# Patient Record
Sex: MALE | Race: WHITE | NOT HISPANIC OR LATINO | Employment: FULL TIME | URBAN - NONMETROPOLITAN AREA
[De-identification: names, ages, dates, MRNs, and addresses within clinical notes are randomized per-mention and may not be internally consistent; named-entity substitution may affect disease eponyms.]

---

## 2022-05-21 ENCOUNTER — OFFICE VISIT (OUTPATIENT)
Dept: FAMILY MEDICINE | Facility: OTHER | Age: 47
End: 2022-05-21
Attending: NURSE PRACTITIONER

## 2022-05-21 VITALS
OXYGEN SATURATION: 95 % | TEMPERATURE: 98.6 F | RESPIRATION RATE: 14 BRPM | DIASTOLIC BLOOD PRESSURE: 80 MMHG | SYSTOLIC BLOOD PRESSURE: 130 MMHG | HEART RATE: 80 BPM | WEIGHT: 178 LBS

## 2022-05-21 DIAGNOSIS — Z11.3 SCREEN FOR STD (SEXUALLY TRANSMITTED DISEASE): ICD-10-CM

## 2022-05-21 DIAGNOSIS — R21 RASH OF GENITALIA: Primary | ICD-10-CM

## 2022-05-21 DIAGNOSIS — A60.01 HERPES SIMPLEX INFECTION OF PENIS: ICD-10-CM

## 2022-05-21 LAB
C TRACH DNA SPEC QL PROBE+SIG AMP: NEGATIVE
N GONORRHOEA DNA SPEC QL NAA+PROBE: NEGATIVE

## 2022-05-21 PROCEDURE — 87529 HSV DNA AMP PROBE: CPT | Mod: ZL | Performed by: NURSE PRACTITIONER

## 2022-05-21 PROCEDURE — 86780 TREPONEMA PALLIDUM: CPT | Mod: ZL | Performed by: NURSE PRACTITIONER

## 2022-05-21 PROCEDURE — 87591 N.GONORRHOEAE DNA AMP PROB: CPT | Mod: ZL | Performed by: NURSE PRACTITIONER

## 2022-05-21 PROCEDURE — 99203 OFFICE O/P NEW LOW 30 MIN: CPT | Performed by: NURSE PRACTITIONER

## 2022-05-21 PROCEDURE — 87491 CHLMYD TRACH DNA AMP PROBE: CPT | Mod: ZL | Performed by: NURSE PRACTITIONER

## 2022-05-21 PROCEDURE — 36415 COLL VENOUS BLD VENIPUNCTURE: CPT | Mod: ZL | Performed by: NURSE PRACTITIONER

## 2022-05-21 ASSESSMENT — PAIN SCALES - GENERAL: PAINLEVEL: NO PAIN (0)

## 2022-05-21 NOTE — PATIENT INSTRUCTIONS
Syphilis, herpes tests pending. Gonorrhea and chlamydia tests will result today. We will be in touch with results and treatment plan.     If all comes back negative, and symptoms persist recommend seeing your PCP.

## 2022-05-21 NOTE — PROGRESS NOTES
ASSESSMENT/PLAN:    I have reviewed the nursing notes.  I have reviewed the findings, diagnosis, plan and need for follow up with the patient.    1. Screen for STD (sexually transmitted disease)  - GC/Chlamydia by PCR  - Treponema Ab w Reflex to RPR and Titer  - Herpes Simplex Virus 1&2 by PCR  Negative gonorrhea and chlamydia.     2. Rash of genitalia  - Treponema Ab w Reflex to RPR and Titer  - Herpes Simplex Virus 1&2 by PCR; Standing  Results pending; if positive will treat accordingly.     Follow up if symptoms persist or worsen or concerns    I explained my diagnostic considerations and recommendations to the patient, who voiced understanding and agreement with the treatment plan. All questions were answered. We discussed potential side effects of any prescribed or recommended therapies, as well as expectations for response to treatments.    Sylvia Eubanks NP  5/21/2022  11:49 AM    HPI:  Ham Simpson is a 47 year old male who presents to [facility] today for concerns of std concerns. Desires testing today. Possible exposure. A few red bumps/lesions appeared on his penis spontaneously on Wednesday that itch but are not painful. He has not experienced something like this before. He has only one female partner and no other partners. He is worried and wants to figure out what this is.   No burning with urination or any urinary symptoms. No penile discharge. No known hx of stds.     History reviewed. No pertinent past medical history.  History reviewed. No pertinent surgical history.  Social History     Tobacco Use     Smoking status: Never Smoker     Smokeless tobacco: Current User   Substance Use Topics     Alcohol use: Not on file     No current outpatient medications on file.     Allergies   Allergen Reactions     Anesthesia S-I-60 [Kdc:Egg Phospholipids+Sodium Metabisulfite+Soybean Oil+Propofol]      Malignant hyperthermia.     Benzocaine Other (See Comments)     General anesthesia     Past medical  history, past surgical history, current medications and allergies reviewed and accurate to the best of my knowledge.      ROS:  Refer to HPI    /80 (BP Location: Right arm, Patient Position: Sitting, Cuff Size: Adult Regular)   Pulse 80   Temp 98.6  F (37  C) (Tympanic)   Resp 14   Wt 80.7 kg (178 lb)   SpO2 95%     EXAM:  General Appearance: Well appearing 47 year old male, appropriate appearance for age. No acute distress   Respiratory:   No increased work of breathing.  No cough appreciated.  :  There are several erythematous bumps with center scabs present on shaft of penis.   Psychological: normal affect, alert, oriented, and pleasant.     Results for orders placed or performed in visit on 05/21/22   GC/Chlamydia by PCR     Status: Normal    Specimen: Urine, Voided   Result Value Ref Range    Chlamydia Trachomatis Negative Negative    Neisseria gonorrhoeae Negative Negative    Narrative    Assay performed using [employer] real-time, reverse-transcriptase PCR.

## 2022-05-21 NOTE — NURSING NOTE
Chief Complaint   Patient presents with     STD       Initial /80 (BP Location: Right arm, Patient Position: Sitting, Cuff Size: Adult Regular)   Pulse 80   Temp 98.6  F (37  C) (Tympanic)   Resp 14   Wt 80.7 kg (178 lb)   SpO2 95%  There is no height or weight on file to calculate BMI.  Medication Reconciliation: complete      FOOD SECURITY SCREENING QUESTIONS:    The next two questions are to help us understand your food security.  If you are feeling you need any assistance in this area, we have resources available to support you today.    Hunger Vital Signs:  Within the past 12 months we worried whether our food would run out before we got money to buy more. Never  Within the past 12 months the food we bought just didn't last and we didn't have money to get more. Never        Advance care plan reviewed      Gloria Gibbs LPN on 5/21/2022 at 11:33 AM

## 2022-05-22 LAB
HSV1 DNA SPEC QL NAA+PROBE: NOT DETECTED
HSV2 DNA SPEC QL NAA+PROBE: DETECTED
T PALLIDUM AB SER QL: NONREACTIVE

## 2022-05-22 RX ORDER — VALACYCLOVIR HYDROCHLORIDE 1 G/1
1000 TABLET, FILM COATED ORAL 2 TIMES DAILY
Qty: 20 TABLET | Refills: 0 | Status: SHIPPED | OUTPATIENT
Start: 2022-05-22 | End: 2022-06-01

## 2023-09-22 ENCOUNTER — HOSPITAL ENCOUNTER (EMERGENCY)
Facility: OTHER | Age: 48
Discharge: HOME | End: 2023-09-22
Attending: STUDENT IN AN ORGANIZED HEALTH CARE EDUCATION/TRAINING PROGRAM | Admitting: STUDENT IN AN ORGANIZED HEALTH CARE EDUCATION/TRAINING PROGRAM
Payer: COMMERCIAL

## 2023-09-22 VITALS
BODY MASS INDEX: 29.02 KG/M2 | WEIGHT: 170 LBS | TEMPERATURE: 98.2 F | RESPIRATION RATE: 7 BRPM | OXYGEN SATURATION: 100 % | HEIGHT: 64 IN | SYSTOLIC BLOOD PRESSURE: 123 MMHG | HEART RATE: 70 BPM | DIASTOLIC BLOOD PRESSURE: 93 MMHG

## 2023-09-22 DIAGNOSIS — R55 PRE-SYNCOPE: ICD-10-CM

## 2023-09-22 LAB
ANION GAP SERPL CALCULATED.3IONS-SCNC: 13 MMOL/L (ref 7–15)
BASOPHILS # BLD AUTO: 0.1 10E3/UL (ref 0–0.2)
BASOPHILS NFR BLD AUTO: 1 %
BUN SERPL-MCNC: 16.3 MG/DL (ref 6–20)
CALCIUM SERPL-MCNC: 9.8 MG/DL (ref 8.6–10)
CHLORIDE SERPL-SCNC: 101 MMOL/L (ref 98–107)
CREAT SERPL-MCNC: 0.8 MG/DL (ref 0.67–1.17)
D DIMER PPP FEU-MCNC: 0.39 UG/ML FEU (ref 0–0.5)
DEPRECATED HCO3 PLAS-SCNC: 23 MMOL/L (ref 22–29)
EGFRCR SERPLBLD CKD-EPI 2021: >90 ML/MIN/1.73M2
EOSINOPHIL # BLD AUTO: 0.2 10E3/UL (ref 0–0.7)
EOSINOPHIL NFR BLD AUTO: 2 %
ERYTHROCYTE [DISTWIDTH] IN BLOOD BY AUTOMATED COUNT: 12.7 % (ref 10–15)
GLUCOSE SERPL-MCNC: 87 MG/DL (ref 70–99)
HCT VFR BLD AUTO: 47.9 % (ref 40–53)
HGB BLD-MCNC: 16.5 G/DL (ref 13.3–17.7)
IMM GRANULOCYTES # BLD: 0 10E3/UL
IMM GRANULOCYTES NFR BLD: 0 %
LYMPHOCYTES # BLD AUTO: 2.4 10E3/UL (ref 0.8–5.3)
LYMPHOCYTES NFR BLD AUTO: 23 %
MCH RBC QN AUTO: 30.6 PG (ref 26.5–33)
MCHC RBC AUTO-ENTMCNC: 34.4 G/DL (ref 31.5–36.5)
MCV RBC AUTO: 89 FL (ref 78–100)
MONOCYTES # BLD AUTO: 1 10E3/UL (ref 0–1.3)
MONOCYTES NFR BLD AUTO: 9 %
NEUTROPHILS # BLD AUTO: 6.8 10E3/UL (ref 1.6–8.3)
NEUTROPHILS NFR BLD AUTO: 65 %
NRBC # BLD AUTO: 0 10E3/UL
NRBC BLD AUTO-RTO: 0 /100
PLATELET # BLD AUTO: 326 10E3/UL (ref 150–450)
POTASSIUM SERPL-SCNC: 4.5 MMOL/L (ref 3.4–5.3)
RBC # BLD AUTO: 5.39 10E6/UL (ref 4.4–5.9)
SODIUM SERPL-SCNC: 137 MMOL/L (ref 136–145)
TROPONIN T SERPL HS-MCNC: <6 NG/L
WBC # BLD AUTO: 10.4 10E3/UL (ref 4–11)

## 2023-09-22 PROCEDURE — 85379 FIBRIN DEGRADATION QUANT: CPT | Performed by: STUDENT IN AN ORGANIZED HEALTH CARE EDUCATION/TRAINING PROGRAM

## 2023-09-22 PROCEDURE — 93005 ELECTROCARDIOGRAM TRACING: CPT | Performed by: STUDENT IN AN ORGANIZED HEALTH CARE EDUCATION/TRAINING PROGRAM

## 2023-09-22 PROCEDURE — 99284 EMERGENCY DEPT VISIT MOD MDM: CPT | Performed by: STUDENT IN AN ORGANIZED HEALTH CARE EDUCATION/TRAINING PROGRAM

## 2023-09-22 PROCEDURE — 93010 ELECTROCARDIOGRAM REPORT: CPT | Performed by: STUDENT IN AN ORGANIZED HEALTH CARE EDUCATION/TRAINING PROGRAM

## 2023-09-22 PROCEDURE — 85025 COMPLETE CBC W/AUTO DIFF WBC: CPT | Performed by: STUDENT IN AN ORGANIZED HEALTH CARE EDUCATION/TRAINING PROGRAM

## 2023-09-22 PROCEDURE — 99283 EMERGENCY DEPT VISIT LOW MDM: CPT | Performed by: STUDENT IN AN ORGANIZED HEALTH CARE EDUCATION/TRAINING PROGRAM

## 2023-09-22 PROCEDURE — 84484 ASSAY OF TROPONIN QUANT: CPT | Performed by: STUDENT IN AN ORGANIZED HEALTH CARE EDUCATION/TRAINING PROGRAM

## 2023-09-22 PROCEDURE — 36415 COLL VENOUS BLD VENIPUNCTURE: CPT | Performed by: STUDENT IN AN ORGANIZED HEALTH CARE EDUCATION/TRAINING PROGRAM

## 2023-09-22 PROCEDURE — 80048 BASIC METABOLIC PNL TOTAL CA: CPT | Performed by: STUDENT IN AN ORGANIZED HEALTH CARE EDUCATION/TRAINING PROGRAM

## 2023-09-22 ASSESSMENT — ACTIVITIES OF DAILY LIVING (ADL): ADLS_ACUITY_SCORE: 35

## 2023-09-22 NOTE — ED PROVIDER NOTES
"  History     Chief Complaint   Patient presents with    Dizziness       Ham Simpson is a 48 year old male presenting with presyncope. This occurred 45 minutes ago at 10 AM while at work.  He was upright working on a culvert when all of a sudden he developed lightheadedness, diaphoresis, and blackout of his vision and drop to his knee and then recovered.  Episode lasted several seconds.  Loss of consciousness.  He has subsequently been having some milder lightheadedness since then.  It does not seem to be associated with positional changes.  He is unclear if he was standing or bending over with the onset of his initial symptoms.  Denies any associated chest pain or palpitations.  Denies any medication use.  Reports recent good hydration.  No recent illnesses.  No headache, confusion, vision change, recent vomiting or diarrhea.  No associated emotional response preceding presyncope.    Allergies   Allergen Reactions    Anesthesia S-I-60 [Propofol]      Malignant hyperthermia.    Benzocaine Other (See Comments)     General anesthesia       There are no problems to display for this patient.      History reviewed. No pertinent past medical history.    History reviewed. No pertinent surgical history.    History reviewed. No pertinent family history.    Social History     Tobacco Use    Smoking status: Never    Smokeless tobacco: Current     Types: Chew   Substance Use Topics    Alcohol use: Yes     Alcohol/week: 14.0 standard drinks of alcohol     Types: 14 Cans of beer per week       Medications:    valACYclovir (VALTREX) 1000 mg tablet        Review of Systems: See HPI for pertinent negatives and positives. All other systems reviewed and found to be negative.    Physical Exam   BP (!) 123/93   Pulse 70   Temp 98.2  F (36.8  C)   Resp (!) 7   Ht 1.626 m (5' 4\")   Wt 77.1 kg (170 lb)   SpO2 100%   BMI 29.18 kg/m       General: awake, comfortable  HEENT: atraumatic  Respiratory: normal effort, clear to " auscultation bilaterally  Cardiovascular: regular rate and rhythm, no murmurs, radial pulses 2+ symmetric  Abdomen: soft, nontender, nondistended  Extremities: no deformities, edema, or tenderness  Skin: warm, dry, no rashes  Neuro: alert, no focal deficits    ED Course      ED Course as of 09/22/23 1336   Fri Sep 22, 2023   1031 EKG: NSR, no evidence of acute ischemia with no ST abnormalities, LBBB, I/II/V4-6 TWI.        Results for orders placed or performed during the hospital encounter of 09/22/23 (from the past 24 hour(s))   CBC with platelets differential    Narrative    The following orders were created for panel order CBC with platelets differential.  Procedure                               Abnormality         Status                     ---------                               -----------         ------                     CBC with platelets and d...[[id number]]                      Final result                 Please view results for these tests on the individual orders.   D dimer quantitative   Result Value Ref Range    D-Dimer Quantitative 0.39 0.00 - 0.50 ug/mL FEU    Narrative    This D-dimer assay is intended for use in conjunction with a clinical pretest probability assessment model to exclude pulmonary embolism (PE) and deep venous thrombosis (DVT) in outpatients suspected of PE or DVT. The cut-off value is 0.50 ug/mL FEU.   Basic metabolic panel   Result Value Ref Range    Sodium 137 136 - 145 mmol/L    Potassium 4.5 3.4 - 5.3 mmol/L    Chloride 101 98 - 107 mmol/L    Carbon Dioxide (CO2) 23 22 - 29 mmol/L    Anion Gap 13 7 - 15 mmol/L    Urea Nitrogen 16.3 6.0 - 20.0 mg/dL    Creatinine 0.80 0.67 - 1.17 mg/dL    Calcium 9.8 8.6 - 10.0 mg/dL    Glucose 87 70 - 99 mg/dL    GFR Estimate >90 >60 mL/min/1.73m2   Troponin T, High Sensitivity   Result Value Ref Range    Troponin T, High Sensitivity <6 <=22 ng/L   CBC with platelets and differential   Result Value Ref Range    WBC Count 10.4 4.0 - 11.0  10e3/uL    RBC Count 5.39 4.40 - 5.90 10e6/uL    Hemoglobin 16.5 13.3 - 17.7 g/dL    Hematocrit 47.9 40.0 - 53.0 %    MCV 89 78 - 100 fL    MCH 30.6 26.5 - 33.0 pg    MCHC 34.4 31.5 - 36.5 g/dL    RDW 12.7 10.0 - 15.0 %    Platelet Count 326 150 - 450 10e3/uL    % Neutrophils 65 %    % Lymphocytes 23 %    % Monocytes 9 %    % Eosinophils 2 %    % Basophils 1 %    % Immature Granulocytes 0 %    NRBCs per 100 WBC 0 <1 /100    Absolute Neutrophils 6.8 1.6 - 8.3 10e3/uL    Absolute Lymphocytes 2.4 0.8 - 5.3 10e3/uL    Absolute Monocytes 1.0 0.0 - 1.3 10e3/uL    Absolute Eosinophils 0.2 0.0 - 0.7 10e3/uL    Absolute Basophils 0.1 0.0 - 0.2 10e3/uL    Absolute Immature Granulocytes 0.0 <=0.4 10e3/uL    Absolute NRBCs 0.0 10e3/uL   Extra Tube *Canceled*    Narrative    The following orders were created for panel order Extra Tube.  Procedure                               Abnormality         Status                     ---------                               -----------         ------                     Extra Red Top Tube[[id number]]                                                            Please view results for these tests on the individual orders.       Medications - No data to display    Assessments & Plan (with Medical Decision Making)     I have reviewed the nursing notes.    Evaluated for presyncope. Given the patient's age, preceding prodrome, lack of a post-ictal period, low risk medical history, benign physical exam including vital signs, and unremarkable laboratory results, this patient is unlikely to have syncope secondary to a life threatening condition. The history suggests that the etiology of this patient's presyncope may be vasovagal. With this analysis, the patient does not require further emergent diagnostic or therapeutic intervention and is appropriate for further outpatient management.  We did discuss serial troponin, however with very low suspicion we ultimately opted to forego repeat testing.  We  also discussed potential outpatient cardiac monitoring if he has any recurrent events.  Patient given instructions on follow-up/ED return precautions. Patient comfortable with plan and discharged home in stable condition.    I have reviewed the findings, diagnosis, plan and need for follow up with the patient.    Patient instructions:   Your evaluation here was reassuring for unlikely cause for your symptoms at this time.    Please review attached instructions including reasons to return to the emergency department.     Discharge Medication List as of 9/22/2023 12:03 PM          Final diagnoses:   Pre-syncope       9/22/2023   [facility]       Choco Donohue MD  09/22/23 4034

## 2023-09-22 NOTE — DISCHARGE INSTRUCTIONS
Your evaluation here was reassuring for unlikely cause for your symptoms at this time.    Please review attached instructions including reasons to return to the emergency department.

## 2023-09-22 NOTE — ED TRIAGE NOTES
Pt arrives via private vehicle after getting dizzy and vision going black for a few seconds, pt was conscious throughout episode. Pt states he went down on one knee to collect himself. Pt denies ever having similar episodes.     Triage Assessment       Row Name 09/22/23 1017       Triage Assessment (Adult)    Airway WDL WDL       Respiratory WDL    Respiratory WDL WDL       Skin Circulation/Temperature WDL    Skin Circulation/Temperature WDL WDL       Cardiac WDL    Cardiac WDL WDL       Peripheral/Neurovascular WDL    Peripheral Neurovascular WDL WDL       Cognitive/Neuro/Behavioral WDL    Cognitive/Neuro/Behavioral WDL WDL

## 2023-09-22 NOTE — ED NOTES
Orthostatic BP completed, which were negative.   Pt denied any dizziness with the postion changes.   Sharri Menjivar RN on 9/22/2023 at 10:46 AM

## 2023-09-25 LAB
ATRIAL RATE - MUSE: 70 BPM
DIASTOLIC BLOOD PRESSURE - MUSE: NORMAL MMHG
INTERPRETATION ECG - MUSE: NORMAL
P AXIS - MUSE: 28 DEGREES
PR INTERVAL - MUSE: 156 MS
QRS DURATION - MUSE: 108 MS
QT - MUSE: 402 MS
QTC - MUSE: 434 MS
R AXIS - MUSE: -4 DEGREES
SYSTOLIC BLOOD PRESSURE - MUSE: NORMAL MMHG
T AXIS - MUSE: 20 DEGREES
VENTRICULAR RATE- MUSE: 70 BPM

## 2024-08-28 ENCOUNTER — HOSPITAL ENCOUNTER (EMERGENCY)
Facility: HOSPITAL | Age: 49
Discharge: HOME | End: 2024-08-28
Attending: NURSE PRACTITIONER | Admitting: NURSE PRACTITIONER
Payer: COMMERCIAL

## 2024-08-28 ENCOUNTER — APPOINTMENT (OUTPATIENT)
Dept: GENERAL RADIOLOGY | Facility: HOSPITAL | Age: 49
End: 2024-08-28
Attending: NURSE PRACTITIONER
Payer: COMMERCIAL

## 2024-08-28 VITALS
TEMPERATURE: 99.9 F | RESPIRATION RATE: 15 BRPM | SYSTOLIC BLOOD PRESSURE: 151 MMHG | HEIGHT: 64 IN | HEART RATE: 97 BPM | DIASTOLIC BLOOD PRESSURE: 89 MMHG | BODY MASS INDEX: 30.22 KG/M2 | WEIGHT: 177 LBS | OXYGEN SATURATION: 96 %

## 2024-08-28 DIAGNOSIS — M25.562 LEFT KNEE PAIN: Primary | ICD-10-CM

## 2024-08-28 PROCEDURE — 96372 THER/PROPH/DIAG INJ SC/IM: CPT | Performed by: NURSE PRACTITIONER

## 2024-08-28 PROCEDURE — 250N000011 HC RX IP 250 OP 636: Performed by: NURSE PRACTITIONER

## 2024-08-28 PROCEDURE — 99213 OFFICE O/P EST LOW 20 MIN: CPT | Performed by: NURSE PRACTITIONER

## 2024-08-28 PROCEDURE — G0463 HOSPITAL OUTPT CLINIC VISIT: HCPCS | Mod: 25

## 2024-08-28 PROCEDURE — 73562 X-RAY EXAM OF KNEE 3: CPT | Mod: LT

## 2024-08-28 RX ORDER — KETOROLAC TROMETHAMINE 30 MG/ML
30 INJECTION, SOLUTION INTRAMUSCULAR; INTRAVENOUS ONCE
Status: COMPLETED | OUTPATIENT
Start: 2024-08-28 | End: 2024-08-28

## 2024-08-28 RX ORDER — PREDNISONE 10 MG/1
40 TABLET ORAL DAILY
Qty: 30 TABLET | Refills: 0 | Status: SHIPPED | OUTPATIENT
Start: 2024-08-28 | End: 2024-08-28

## 2024-08-28 RX ADMIN — KETOROLAC TROMETHAMINE 30 MG: 30 INJECTION, SOLUTION INTRAMUSCULAR at 19:48

## 2024-08-28 ASSESSMENT — ENCOUNTER SYMPTOMS
CHILLS: 0
FEVER: 0
DIARRHEA: 0
SHORTNESS OF BREATH: 0
PSYCHIATRIC NEGATIVE: 1
NAUSEA: 0
VOMITING: 0

## 2024-08-28 ASSESSMENT — ACTIVITIES OF DAILY LIVING (ADL): ADLS_ACUITY_SCORE: 35

## 2024-08-29 NOTE — ED PROVIDER NOTES
History     Chief Complaint   Patient presents with    Leg Pain     Left leg pain (calf)     HPI  Ham Simpson is a 49 year old male who presents to urgent care today via wheelchair with complaints of left knee pain that started earlier today while patient was walking around at work (not work comp injury).  Decreased ROM.  No fall, injury or trauma.  No previous fracture, dislocation or surgery to left knee.  No calf pain or tenderness.  No history of DVT/PE.  Denies any fever, chills, nausea, vomiting, diarrhea, shortness of breath or chest pain.  No OTC meds.  No other concerns    Allergies:  Allergies   Allergen Reactions    Anesthesia S-I-60 [Propofol]      Malignant hyperthermia.    Anesthetics, Amide Unknown     Patient states had reaction to anesthesia during a surgery when he was younger.  He was unsure of what type of anesthesia it was.    Benzocaine Other (See Comments)     General anesthesia       Problem List:    There are no problems to display for this patient.       Past Medical History:    No past medical history on file.    Past Surgical History:    No past surgical history on file.    Family History:    No family history on file.    Social History:  Marital Status:  Single [1]  Social History     Tobacco Use    Smoking status: Never    Smokeless tobacco: Current     Types: Chew   Substance Use Topics    Alcohol use: Yes     Alcohol/week: 14.0 standard drinks of alcohol     Types: 14 Cans of beer per week        Medications:    valACYclovir (VALTREX) 1000 mg tablet      Review of Systems   Constitutional:  Negative for chills and fever.   Respiratory:  Negative for shortness of breath.    Cardiovascular:  Negative for chest pain.   Gastrointestinal:  Negative for diarrhea, nausea and vomiting.   Musculoskeletal:  Negative for gait problem.        Left knee pain   Skin: Negative.    Psychiatric/Behavioral: Negative.       Physical Exam   BP: 151/89  Pulse: 97  Temp: 99.9  F (37.7  C)  Resp:  "15  Height: 162.6 cm (5' 4\")  Weight: 80.3 kg (177 lb)  SpO2: 96 %    Physical Exam  Vitals and nursing note reviewed.   Constitutional:       General: He is not in acute distress.     Appearance: Normal appearance. He is not ill-appearing or toxic-appearing.   Cardiovascular:      Rate and Rhythm: Normal rate and regular rhythm.      Pulses: Normal pulses.      Heart sounds: Normal heart sounds.   Pulmonary:      Effort: Pulmonary effort is normal.      Breath sounds: Normal breath sounds.   Musculoskeletal:      Left hip: Normal.      Left knee: No swelling, deformity, effusion, erythema, ecchymosis, lacerations or crepitus. Decreased range of motion. Tenderness present. Normal pulse.      Left ankle: Normal.   Skin:     General: Skin is warm and dry.      Capillary Refill: Capillary refill takes less than 2 seconds.   Neurological:      Mental Status: He is alert.   Psychiatric:         Mood and Affect: Mood normal.       ED Course     Procedures    No results found for this or any previous visit (from the past 24 hour(s)).    Medications   ketorolac (TORADOL) injection 30 mg (30 mg Intramuscular $Given 8/28/24 1948)     Assessments & Plan (with Medical Decision Making)     I have reviewed the nursing notes.    I have reviewed the findings, diagnosis, plan and need for follow up with the patient.  (M25.562) Left knee pain  Plan:   Patient arrived via wheelchair with complaints of left knee pain that started earlier today while patient was walking around at work.  No fall, injury or trauma.  No previous fracture, dislocation or surgery to left knee.  Denies any fever, chills, nausea, vomiting, diarrhea, shortness of breath or chest pain.  Decreased ROM to left knee.  Left knee x-ray shows no evidence of acute or subacute bony abnormality.  Subchondral sclerosis and lucency of the medial femoral condyle suggesting the possibility of osteonecrosis or insufficiency fracture.  MRI would better characterize this " finding.  No evidence of apparent femoral collapse.  Patient's symptoms and presentation not consistent with fracture at this time as patient did not have any fall, injury or trauma.  No erythema, open skin wounds or signs of infection.  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  Order for MRI left knee without contrast placed.  Orthopedic referral placed and handed referral to patient incase he chooses to go somewhere else.  Patient encouraged to follow [facility].  Ace wrap was applied.  Crutches given to help stay off left knee.  Declines work note, patient states he really needs to go to work and will figure it out.  Toradol administered in urgent care for pain, patient to alternate Tylenol and ibuprofen as needed.  Follow-up with PCP or return to urgent care/ED with any worsening in condition or additional concerns.  Patient in agreement with treatment plan.    Discharge Medication List as of 8/28/2024  8:24 PM        Final diagnoses:   Left knee pain     8/28/2024   [facility]       Chelsey Redd NP  08/30/24 [id number]

## 2024-08-29 NOTE — ED TRIAGE NOTES
Pt presents with c/o left lower leg (calf pain). Sx started earlier today. Reports he was at work walking around when pain started. CMS intact. Limited ROM due to pain. Pt was wheeled to room. No otc meds.

## 2024-08-29 NOTE — DISCHARGE INSTRUCTIONS
Rest  Ice  Compression with ace wrap  Elevate  Tylenol and ibuprofen as needed for pain  Schedule MRI for further evaluation  Follow up with orthopedics by calling [phone] to schedule an appointment  Follow-up with primary care provider or return to urgent care/ED with any worsening in condition or additional concerns.

## 2024-09-19 ENCOUNTER — TRANSFERRED RECORDS (OUTPATIENT)
Dept: HEALTH INFORMATION MANAGEMENT | Facility: CLINIC | Age: 49
End: 2024-09-19
Payer: COMMERCIAL